# Patient Record
Sex: MALE | Race: WHITE | URBAN - METROPOLITAN AREA
[De-identification: names, ages, dates, MRNs, and addresses within clinical notes are randomized per-mention and may not be internally consistent; named-entity substitution may affect disease eponyms.]

---

## 2021-06-08 ENCOUNTER — TELEPHONE (OUTPATIENT)
Dept: AUDIOLOGY | Facility: CLINIC | Age: 71
End: 2021-06-08

## 2021-06-08 NOTE — TELEPHONE ENCOUNTER
I returned call and left a message in voicemail regarding which brands we carry, the need for a current audiologist performed hearing test and the appointment process.  I offered to answer more questions if she returns my call.    Hugo Khan MA, CCC-A  [place] Licensed Audiologist #[id number]  [facility]        6/9/2021        Reason for Call:  Other hearing aids    Detailed comments: spouse calling. Has some questions before setting up an appt to disuss hearing aids. She is wondering if our audiologists set up hearing aids to work with a smart phone, and what brand hearing aids you use.     Phone Number Patient can be reached at: Other phone number:  Bernie [phone]    Best Time: any    Can we leave a detailed message on this number? YES    Call taken on 6/8/2021 at 2:09 PM by Vannessa Sun